# Patient Record
Sex: FEMALE | URBAN - METROPOLITAN AREA
[De-identification: names, ages, dates, MRNs, and addresses within clinical notes are randomized per-mention and may not be internally consistent; named-entity substitution may affect disease eponyms.]

---
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
## 2025-01-25 NOTE — PROCEDURE: ADDITIONAL NOTES
"Pt presents to ED for abdominal pn, flu-like symptoms, a pregnancy check, and non-descript feeling of \"being unwell/somethings off\". Pt states that she has had PID, \"urethro-plasmisis\" and multiple miscarriages, pt does not believe she is pregnant, but wants to check.  No change in bladder habits, some episodes of loose stool. Pt is AOX4.   "
Additional Notes: Grade one papular acne on face\\nChest and back clear
Detail Level: Simple